# Patient Record
Sex: MALE | Race: WHITE | NOT HISPANIC OR LATINO | Employment: FULL TIME | URBAN - METROPOLITAN AREA
[De-identification: names, ages, dates, MRNs, and addresses within clinical notes are randomized per-mention and may not be internally consistent; named-entity substitution may affect disease eponyms.]

---

## 2019-09-30 ENCOUNTER — HOSPITAL ENCOUNTER (INPATIENT)
Facility: CLINIC | Age: 64
LOS: 2 days | Discharge: HOME | DRG: 378 | End: 2019-10-02
Attending: EMERGENCY MEDICINE | Admitting: INTERNAL MEDICINE
Payer: MEDICARE

## 2019-09-30 DIAGNOSIS — K92.2 GASTROINTESTINAL HEMORRHAGE, UNSPECIFIED GASTROINTESTINAL HEMORRHAGE TYPE: ICD-10-CM

## 2019-09-30 DIAGNOSIS — K92.1 MELENA: ICD-10-CM

## 2019-09-30 DIAGNOSIS — D64.9 ANEMIA, UNSPECIFIED TYPE: ICD-10-CM

## 2019-09-30 DIAGNOSIS — K26.4 GASTROINTESTINAL HEMORRHAGE ASSOCIATED WITH DUODENAL ULCER: Primary | ICD-10-CM

## 2019-09-30 LAB
ABO + RH BLD: NORMAL
ABO + RH BLD: NORMAL
ALBUMIN SERPL-MCNC: 3.4 G/DL (ref 3.4–5)
ALP SERPL-CCNC: 63 U/L (ref 40–150)
ALT SERPL W P-5'-P-CCNC: 28 U/L (ref 0–70)
ANION GAP SERPL CALCULATED.3IONS-SCNC: 4 MMOL/L (ref 3–14)
APTT PPP: 27 SEC (ref 22–37)
AST SERPL W P-5'-P-CCNC: 27 U/L (ref 0–45)
BASOPHILS # BLD AUTO: 0.1 10E9/L (ref 0–0.2)
BASOPHILS NFR BLD AUTO: 0.6 %
BILIRUB DIRECT SERPL-MCNC: 0.1 MG/DL (ref 0–0.2)
BILIRUB SERPL-MCNC: 0.4 MG/DL (ref 0.2–1.3)
BLD GP AB SCN SERPL QL: NORMAL
BLOOD BANK CMNT PATIENT-IMP: NORMAL
BUN SERPL-MCNC: 25 MG/DL (ref 7–30)
CALCIUM SERPL-MCNC: 8.8 MG/DL (ref 8.5–10.1)
CHLORIDE SERPL-SCNC: 105 MMOL/L (ref 94–109)
CO2 SERPL-SCNC: 29 MMOL/L (ref 20–32)
CREAT SERPL-MCNC: 1.3 MG/DL (ref 0.66–1.25)
DIFFERENTIAL METHOD BLD: ABNORMAL
EOSINOPHIL # BLD AUTO: 0.3 10E9/L (ref 0–0.7)
EOSINOPHIL NFR BLD AUTO: 2.1 %
ERYTHROCYTE [DISTWIDTH] IN BLOOD BY AUTOMATED COUNT: 13.2 % (ref 10–15)
GFR SERPL CREATININE-BSD FRML MDRD: 57 ML/MIN/{1.73_M2}
GLUCOSE SERPL-MCNC: 96 MG/DL (ref 70–99)
HCT VFR BLD AUTO: 26.6 % (ref 40–53)
HEMOCCULT STL QL: POSITIVE
HGB BLD-MCNC: 9 G/DL (ref 13.3–17.7)
IMM GRANULOCYTES # BLD: 0.3 10E9/L (ref 0–0.4)
IMM GRANULOCYTES NFR BLD: 2.1 %
INR PPP: 1.07 (ref 0.86–1.14)
LYMPHOCYTES # BLD AUTO: 3.4 10E9/L (ref 0.8–5.3)
LYMPHOCYTES NFR BLD AUTO: 26.5 %
MCH RBC QN AUTO: 32.3 PG (ref 26.5–33)
MCHC RBC AUTO-ENTMCNC: 33.8 G/DL (ref 31.5–36.5)
MCV RBC AUTO: 95 FL (ref 78–100)
MONOCYTES # BLD AUTO: 1 10E9/L (ref 0–1.3)
MONOCYTES NFR BLD AUTO: 8.1 %
NEUTROPHILS # BLD AUTO: 7.8 10E9/L (ref 1.6–8.3)
NEUTROPHILS NFR BLD AUTO: 60.6 %
NRBC # BLD AUTO: 0.1 10*3/UL
NRBC BLD AUTO-RTO: 1 /100
PLATELET # BLD AUTO: 200 10E9/L (ref 150–450)
POTASSIUM SERPL-SCNC: 3.8 MMOL/L (ref 3.4–5.3)
PROT SERPL-MCNC: 6.8 G/DL (ref 6.8–8.8)
RBC # BLD AUTO: 2.79 10E12/L (ref 4.4–5.9)
SODIUM SERPL-SCNC: 138 MMOL/L (ref 133–144)
SPECIMEN EXP DATE BLD: NORMAL
WBC # BLD AUTO: 12.9 10E9/L (ref 4–11)

## 2019-09-30 PROCEDURE — 12000000 ZZH R&B MED SURG/OB

## 2019-09-30 PROCEDURE — 96361 HYDRATE IV INFUSION ADD-ON: CPT

## 2019-09-30 PROCEDURE — 25000132 ZZH RX MED GY IP 250 OP 250 PS 637: Mod: GY | Performed by: INTERNAL MEDICINE

## 2019-09-30 PROCEDURE — 86900 BLOOD TYPING SEROLOGIC ABO: CPT | Performed by: EMERGENCY MEDICINE

## 2019-09-30 PROCEDURE — 25000128 H RX IP 250 OP 636: Performed by: EMERGENCY MEDICINE

## 2019-09-30 PROCEDURE — 99222 1ST HOSP IP/OBS MODERATE 55: CPT | Mod: AI | Performed by: INTERNAL MEDICINE

## 2019-09-30 PROCEDURE — 86850 RBC ANTIBODY SCREEN: CPT | Performed by: EMERGENCY MEDICINE

## 2019-09-30 PROCEDURE — 80048 BASIC METABOLIC PNL TOTAL CA: CPT | Performed by: EMERGENCY MEDICINE

## 2019-09-30 PROCEDURE — 85610 PROTHROMBIN TIME: CPT | Performed by: EMERGENCY MEDICINE

## 2019-09-30 PROCEDURE — 25800030 ZZH RX IP 258 OP 636: Performed by: INTERNAL MEDICINE

## 2019-09-30 PROCEDURE — 99285 EMERGENCY DEPT VISIT HI MDM: CPT | Mod: 25

## 2019-09-30 PROCEDURE — 85730 THROMBOPLASTIN TIME PARTIAL: CPT | Performed by: EMERGENCY MEDICINE

## 2019-09-30 PROCEDURE — 82272 OCCULT BLD FECES 1-3 TESTS: CPT | Performed by: EMERGENCY MEDICINE

## 2019-09-30 PROCEDURE — 80076 HEPATIC FUNCTION PANEL: CPT | Performed by: EMERGENCY MEDICINE

## 2019-09-30 PROCEDURE — 85025 COMPLETE CBC W/AUTO DIFF WBC: CPT | Performed by: EMERGENCY MEDICINE

## 2019-09-30 PROCEDURE — 86901 BLOOD TYPING SEROLOGIC RH(D): CPT | Performed by: EMERGENCY MEDICINE

## 2019-09-30 PROCEDURE — C9113 INJ PANTOPRAZOLE SODIUM, VIA: HCPCS | Performed by: EMERGENCY MEDICINE

## 2019-09-30 PROCEDURE — 96374 THER/PROPH/DIAG INJ IV PUSH: CPT

## 2019-09-30 RX ORDER — TRAZODONE HYDROCHLORIDE 50 MG/1
50 TABLET, FILM COATED ORAL AT BEDTIME
Status: DISCONTINUED | OUTPATIENT
Start: 2019-09-30 | End: 2019-10-02 | Stop reason: HOSPADM

## 2019-09-30 RX ORDER — MULTIPLE VITAMINS W/ MINERALS TAB 9MG-400MCG
1 TAB ORAL DAILY
Status: DISCONTINUED | OUTPATIENT
Start: 2019-09-30 | End: 2019-09-30

## 2019-09-30 RX ORDER — POTASSIUM CHLORIDE 1500 MG/1
20-40 TABLET, EXTENDED RELEASE ORAL
Status: DISCONTINUED | OUTPATIENT
Start: 2019-09-30 | End: 2019-10-02 | Stop reason: HOSPADM

## 2019-09-30 RX ORDER — LIDOCAINE 40 MG/G
CREAM TOPICAL
Status: DISCONTINUED | OUTPATIENT
Start: 2019-09-30 | End: 2019-10-02 | Stop reason: HOSPADM

## 2019-09-30 RX ORDER — ONDANSETRON 2 MG/ML
4 INJECTION INTRAMUSCULAR; INTRAVENOUS EVERY 6 HOURS PRN
Status: DISCONTINUED | OUTPATIENT
Start: 2019-09-30 | End: 2019-10-02 | Stop reason: HOSPADM

## 2019-09-30 RX ORDER — POTASSIUM CHLORIDE 29.8 MG/ML
20 INJECTION INTRAVENOUS
Status: DISCONTINUED | OUTPATIENT
Start: 2019-09-30 | End: 2019-10-02 | Stop reason: HOSPADM

## 2019-09-30 RX ORDER — ONDANSETRON 4 MG/1
4 TABLET, ORALLY DISINTEGRATING ORAL EVERY 6 HOURS PRN
Status: DISCONTINUED | OUTPATIENT
Start: 2019-09-30 | End: 2019-10-02 | Stop reason: HOSPADM

## 2019-09-30 RX ORDER — FOLIC ACID 1 MG/1
1 TABLET ORAL DAILY
Status: DISCONTINUED | OUTPATIENT
Start: 2019-09-30 | End: 2019-09-30

## 2019-09-30 RX ORDER — POTASSIUM CHLORIDE 1.5 G/1.58G
20-40 POWDER, FOR SOLUTION ORAL
Status: DISCONTINUED | OUTPATIENT
Start: 2019-09-30 | End: 2019-10-02 | Stop reason: HOSPADM

## 2019-09-30 RX ORDER — ROSUVASTATIN CALCIUM 5 MG/1
5 TABLET, COATED ORAL DAILY
COMMUNITY

## 2019-09-30 RX ORDER — ACETAMINOPHEN 325 MG/1
650 TABLET ORAL EVERY 4 HOURS PRN
Status: DISCONTINUED | OUTPATIENT
Start: 2019-09-30 | End: 2019-10-02 | Stop reason: HOSPADM

## 2019-09-30 RX ORDER — NALOXONE HYDROCHLORIDE 0.4 MG/ML
.1-.4 INJECTION, SOLUTION INTRAMUSCULAR; INTRAVENOUS; SUBCUTANEOUS
Status: DISCONTINUED | OUTPATIENT
Start: 2019-09-30 | End: 2019-10-02 | Stop reason: HOSPADM

## 2019-09-30 RX ORDER — POTASSIUM CHLORIDE 7.45 MG/ML
10 INJECTION INTRAVENOUS
Status: DISCONTINUED | OUTPATIENT
Start: 2019-09-30 | End: 2019-10-02 | Stop reason: HOSPADM

## 2019-09-30 RX ORDER — POTASSIUM CL/LIDO/0.9 % NACL 10MEQ/0.1L
10 INTRAVENOUS SOLUTION, PIGGYBACK (ML) INTRAVENOUS
Status: DISCONTINUED | OUTPATIENT
Start: 2019-09-30 | End: 2019-10-02 | Stop reason: HOSPADM

## 2019-09-30 RX ORDER — SODIUM CHLORIDE 9 MG/ML
INJECTION, SOLUTION INTRAVENOUS CONTINUOUS
Status: DISCONTINUED | OUTPATIENT
Start: 2019-09-30 | End: 2019-10-02 | Stop reason: HOSPADM

## 2019-09-30 RX ORDER — LANOLIN ALCOHOL/MO/W.PET/CERES
100 CREAM (GRAM) TOPICAL DAILY
Status: DISCONTINUED | OUTPATIENT
Start: 2019-09-30 | End: 2019-09-30

## 2019-09-30 RX ADMIN — SODIUM CHLORIDE 1000 ML: 9 INJECTION, SOLUTION INTRAVENOUS at 18:51

## 2019-09-30 RX ADMIN — Medication 1 MG: at 22:53

## 2019-09-30 RX ADMIN — PANTOPRAZOLE SODIUM 40 MG: 40 INJECTION, POWDER, FOR SOLUTION INTRAVENOUS at 18:51

## 2019-09-30 RX ADMIN — SODIUM CHLORIDE: 9 INJECTION, SOLUTION INTRAVENOUS at 20:35

## 2019-09-30 RX ADMIN — TRAZODONE HYDROCHLORIDE 25 MG: 50 TABLET ORAL at 23:40

## 2019-09-30 ASSESSMENT — MIFFLIN-ST. JEOR: SCORE: 1877.15

## 2019-09-30 NOTE — ED PROVIDER NOTES
History     Chief Complaint:  Blood in Stool     HPI   Augusto Henao is a 64 year old male with a history of hyperlipidemia, pancreatitis, CKD stage III, and gout who presents with blood in stool. The patient reports that 2 days ago he woke up and was feeling fine but noticed that his stool was black when he had a bowel movement. He states that he had felt constipated on Saturday therefore needed to strain. The patient throughout the day progressively became to feel short of breath, lightheaded as if he were going to have a syncopal episode upon standing, weakness, and nausea. However, he did not have any syncope episodes and since his shortness of breath has improved. The patient thas since had a bowel movement daily and they have also been black in color. He was evaluated in clinic today where he had a hemoglobin of 8.8. The patient denies loss of consciousness, vomiting, abdominal pain, chest pain, or history of similar symptoms. The patient does not use blood thinners and he does not have a history of ulcers. The patient occasionally will use aspirin but does not take ibuprofen. Lastly, he endorses 2-3 drinks a day consisting of rum and coke. He has not had any increase in this recently.     Allergies:  Ibuprofen; edema  Indomethacin  Morphine; edema       Medications:    Crestor     Past Medical History:    CKD stage III   Gout   Hyperlipidemia  Pancreatitis     Past Surgical History:    Total hip arthroplasty     Family History:    Family history reviewed. No pertinent family history.     Social History:  The patient was unaccompanied to the ED.  Smoking Status: Never Smoker  Smokeless Tobacco: Never Used  Alcohol Use: Positive  Drug Use: Negative  Marital Status:        Review of Systems   All other systems reviewed and are negative.    Physical Exam     Patient Vitals for the past 24 hrs:   BP Temp Temp src Pulse Resp SpO2 Height Weight   09/30/19 2012 133/75 98  F (36.7  C) Oral 74 16 100 % --  "--   09/30/19 1903 -- -- -- -- -- 97 % -- --   09/30/19 1900 134/67 -- -- 76 -- 91 % -- --   09/30/19 1835 -- -- -- -- -- 92 % -- --   09/30/19 1830 133/79 -- -- 74 -- 98 % -- --   09/30/19 1745 -- -- -- 74 -- 96 % -- --   09/30/19 1740 (!) 157/83 -- -- -- -- -- -- --   09/30/19 1615 (!) 167/82 98.8  F (37.1  C) Oral 74 16 100 % 1.956 m (6' 5\") 98 kg (216 lb)      Physical Exam  General: Resting on the bed.  Head: No obvious trauma to head.  Ears, Nose, Throat:  External ears normal.  Nose normal.  Pale conjunctiva.    Eyes:  Conjunctivae clear.  Pupils are equal, round, and reactive.   Neck: Normal range of motion.  Neck supple.   CV: Regular rate and rhythm.  No murmurs.      Respiratory: Effort normal and breath sounds normal.  No wheezing or crackles.   Gastrointestinal: Soft.  No distension. There is no tenderness.    Neuro: Alert. Moving all extremities appropriately.  Normal speech.    Skin: Skin is warm and dry.  No rash noted.   Rectal: melana noted on MICHA    Emergency Department Course     Laboratory:  Laboratory findings were communicated with the patient who voiced understanding of the findings.    CBC: WBC 12.9 (H), HGB 9.0 (L),   BMP: GFR 57 (L), o/w WNL (Creatinine 1.30 (H))    INR: 1.07  Partial Thromboplastin Time: 27  Hepatic Panel: WNL.    Occult Blood stool: Positive (A)    ABO/Rh type and screen: O positive, antibody negative     Interventions:  1851 NS 1000 mL IV  1851 Protonix 40 mg IV    Emergency Department Course:    1733 Nursing notes and vitals reviewed. I performed an exam of the patient as documented above.     1736 A stool occult blood sample was obtained for laboratory testing as documented above.    1736 IV was inserted and blood was drawn for laboratory testing, results above.     1841 I spoke with Dr. Jarrett of the GI service regarding patient's presentation, findings, and plan of care.     1850 I spoke with Dr. Marie of the hospitalist service from [facility] " regarding patient's presentation, findings, and plan of care.     1855 Patient rechecked and updated.        Prior to admission, I personally reviewed the results with the patient and all related questions were answered. The patient verbalized understanding and is amenable to plan.     Impression & Plan      Medical Decision Making:  Augusto Henao is a 64 year old male who presents to the emergency department today for evaluation of weakness.  Vital signs are reassuring.  Broad differential was pursued including but not limited to upper versus lower GI bleed, peptic ulcer disease, gastritis, esophagitis, variceal bleed, lower GI bleed such as diverticular bleed, fissure, hemorrhoid, etc.  Overall this is most consistent with a upper GI bleed secondary to the dark melena noted on digital rectal exam which is occult positive.  CBC shows mild leukocytosis suspect stress demargination in the setting of bleed.  Hemoglobin is 9 which is a significant drop from August when his hemoglobin was 15.  Platelets were normal.  BMP shows chronic kidney dysfunction but otherwise no acute electrolyte or metabolic abnl.  Coags are within normal limits.  LFTs are within normal limits not suggestive of liver failure.  Patient does drink alcohol regularly but low suspicion for alcohol-related variceal bleed with normal platelets and normal LFTs.  Discussed with gastroenterology who recommended Protonix and likely endoscopy tomorrow.  Patient is otherwise hemodynamically stable in the ER.  No indication for emergent endoscopy in the ER.  GI did not feel that octreotide was indicated.  Patient was admitted to the hospitalist who graciously accepted.    Diagnosis:    ICD-10-CM    1. Melena K92.1 Hepatic panel   2. Gastrointestinal hemorrhage, unspecified gastrointestinal hemorrhage type K92.2    3. Anemia, unspecified type D64.9      Disposition:   The patient is admitted into the care of Dr. Marie.     Al Disclosure:  Andie RODRIGUEZ  [place], am serving as a scribe at 5:37 PM on 9/30/2019 to document services personally performed by Kaylee Whittaker MD based on my observations and the provider's statements to me.    EMERGENCY DEPARTMENT       Kaylee Whittaker MD  10/01/19 0138

## 2019-09-30 NOTE — ED NOTES
"[facility]  ED Nurse Handoff Report    ED Chief complaint: Rectal Bleeding      ED Diagnosis:   Final diagnoses:   None       Code Status: Full Code    Allergies:   Allergies   Allergen Reactions     Ibuprofen      Other reaction(s): Edema     Indomethacin Other (See Comments)     Put him into stage 3 renal failure     Morphine      Other reaction(s): Edema       Activity level - Baseline/Home:  Independent  Activity Level - Current:   Independent    Patient's Preferred language: English   Needed?: No    Isolation: No  Infection: Not Applicable  Bariatric?: No    Vital Signs:   Vitals:    09/30/19 1740 09/30/19 1745 09/30/19 1830 09/30/19 1835   BP: (!) 157/83  133/79    Pulse:  74 74    Resp:       Temp:       TempSrc:       SpO2:  96% 98% 92%   Weight:       Height:           Cardiac Rhythm: ,        Pain level:      Is this patient confused?: No   Does this patient have a guardian?  No         If yes, is there guardianship documents in the Epic \"Code/ACP\" activity?  N/A         Guardian Notified?  N/A  [place] - Suicide Severity Rating Scale Completed?  Yes  If yes, what color did the patient score?  White    Patient Report: Initial Complaint: rectal bleeding  Focused Assessment: Pt presents from home with black stools since Saturday. Pt has no known hx of ulcers but does drink daily (2-3 mix drinks) and reports on Saturday developed dark black stools. Pt states he was dizzy, sob and weak on Saturday but that has been improving. Pt having less frequent stools since Saturday as well. Pt not on blood thinners. hgb 9, type and screen performed. Positive stool occult. VSS. Pt a/ox4, independent   Tests Performed: Blood work, stool occult  Abnormal Results:   Labs Ordered and Resulted from Time of ED Arrival Up to the Time of Departure from the ED   CBC WITH PLATELETS DIFFERENTIAL - Abnormal; Notable for the following components:       Result Value    WBC 12.9 (*)     RBC Count 2.79 (*)  "    Hemoglobin 9.0 (*)     Hematocrit 26.6 (*)     Nucleated RBCs 1 (*)     All other components within normal limits   BASIC METABOLIC PANEL - Abnormal; Notable for the following components:    Creatinine 1.30 (*)     GFR Estimate 57 (*)     All other components within normal limits   OCCULT BLOOD STOOL - Abnormal; Notable for the following components:    Occult Blood Positive (*)     All other components within normal limits   INR   PARTIAL THROMBOPLASTIN TIME   HEPATIC PANEL   ABO/RH TYPE AND SCREEN       Treatments provided: 1 liter NS, protonix    Family Comments: None present    OBS brochure/video discussed/provided to patient/family: N/A              Name of person given brochure if not patient: n/a              Relationship to patient: n/a    ED Medications: Medications - No data to display    Drips infusing?:  No    For the majority of the shift this patient was Green.   Interventions performed were meds, repo, updates.    Severe Sepsis OR Septic Shock Diagnosis Present: No    To be done/followed up on inpatient unit:  inpt orders    ED NURSE PHONE NUMBER: *[phone]

## 2019-10-01 LAB
ANION GAP SERPL CALCULATED.3IONS-SCNC: 3 MMOL/L (ref 3–14)
BUN SERPL-MCNC: 17 MG/DL (ref 7–30)
CALCIUM SERPL-MCNC: 7.9 MG/DL (ref 8.5–10.1)
CHLORIDE SERPL-SCNC: 111 MMOL/L (ref 94–109)
CO2 SERPL-SCNC: 26 MMOL/L (ref 20–32)
CREAT SERPL-MCNC: 1.29 MG/DL (ref 0.66–1.25)
ERYTHROCYTE [DISTWIDTH] IN BLOOD BY AUTOMATED COUNT: 13.7 % (ref 10–15)
GFR SERPL CREATININE-BSD FRML MDRD: 57 ML/MIN/{1.73_M2}
GLUCOSE SERPL-MCNC: 94 MG/DL (ref 70–99)
HCT VFR BLD AUTO: 22.5 % (ref 40–53)
HGB BLD-MCNC: 7.5 G/DL (ref 13.3–17.7)
HGB BLD-MCNC: 7.5 G/DL (ref 13.3–17.7)
HGB BLD-MCNC: 8.1 G/DL (ref 13.3–17.7)
HGB BLD-MCNC: 8.3 G/DL (ref 13.3–17.7)
MCH RBC QN AUTO: 32.3 PG (ref 26.5–33)
MCHC RBC AUTO-ENTMCNC: 33.3 G/DL (ref 31.5–36.5)
MCV RBC AUTO: 97 FL (ref 78–100)
PLATELET # BLD AUTO: 146 10E9/L (ref 150–450)
POTASSIUM SERPL-SCNC: 3.8 MMOL/L (ref 3.4–5.3)
RBC # BLD AUTO: 2.32 10E12/L (ref 4.4–5.9)
SODIUM SERPL-SCNC: 140 MMOL/L (ref 133–144)
UPPER GI ENDOSCOPY: NORMAL
WBC # BLD AUTO: 8.3 10E9/L (ref 4–11)

## 2019-10-01 PROCEDURE — 25000128 H RX IP 250 OP 636: Performed by: INTERNAL MEDICINE

## 2019-10-01 PROCEDURE — C9113 INJ PANTOPRAZOLE SODIUM, VIA: HCPCS | Performed by: INTERNAL MEDICINE

## 2019-10-01 PROCEDURE — 25000132 ZZH RX MED GY IP 250 OP 250 PS 637: Mod: GY | Performed by: INTERNAL MEDICINE

## 2019-10-01 PROCEDURE — 85018 HEMOGLOBIN: CPT | Performed by: INTERNAL MEDICINE

## 2019-10-01 PROCEDURE — 12000000 ZZH R&B MED SURG/OB

## 2019-10-01 PROCEDURE — 80048 BASIC METABOLIC PNL TOTAL CA: CPT | Performed by: INTERNAL MEDICINE

## 2019-10-01 PROCEDURE — 85027 COMPLETE CBC AUTOMATED: CPT | Performed by: INTERNAL MEDICINE

## 2019-10-01 PROCEDURE — 40000104 ZZH STATISTIC MODERATE SEDATION < 10 MIN: Performed by: INTERNAL MEDICINE

## 2019-10-01 PROCEDURE — 99232 SBSQ HOSP IP/OBS MODERATE 35: CPT | Performed by: INTERNAL MEDICINE

## 2019-10-01 PROCEDURE — 36415 COLL VENOUS BLD VENIPUNCTURE: CPT | Performed by: INTERNAL MEDICINE

## 2019-10-01 PROCEDURE — 0DJ08ZZ INSPECTION OF UPPER INTESTINAL TRACT, VIA NATURAL OR ARTIFICIAL OPENING ENDOSCOPIC: ICD-10-PCS | Performed by: INTERNAL MEDICINE

## 2019-10-01 PROCEDURE — 43235 EGD DIAGNOSTIC BRUSH WASH: CPT | Performed by: INTERNAL MEDICINE

## 2019-10-01 PROCEDURE — 25800030 ZZH RX IP 258 OP 636: Performed by: INTERNAL MEDICINE

## 2019-10-01 RX ORDER — LIDOCAINE 40 MG/G
CREAM TOPICAL
Status: DISCONTINUED | OUTPATIENT
Start: 2019-10-01 | End: 2019-10-01 | Stop reason: HOSPADM

## 2019-10-01 RX ORDER — FENTANYL CITRATE 50 UG/ML
INJECTION, SOLUTION INTRAMUSCULAR; INTRAVENOUS PRN
Status: DISCONTINUED | OUTPATIENT
Start: 2019-10-01 | End: 2019-10-01 | Stop reason: HOSPADM

## 2019-10-01 RX ADMIN — PANTOPRAZOLE SODIUM 40 MG: 40 INJECTION, POWDER, FOR SOLUTION INTRAVENOUS at 08:03

## 2019-10-01 RX ADMIN — SODIUM CHLORIDE: 9 INJECTION, SOLUTION INTRAVENOUS at 16:33

## 2019-10-01 RX ADMIN — PANTOPRAZOLE SODIUM 40 MG: 40 INJECTION, POWDER, FOR SOLUTION INTRAVENOUS at 21:18

## 2019-10-01 RX ADMIN — SODIUM CHLORIDE: 9 INJECTION, SOLUTION INTRAVENOUS at 06:22

## 2019-10-01 RX ADMIN — POLYETHYLENE GLYCOL 3350, SODIUM SULFATE ANHYDROUS, SODIUM BICARBONATE, SODIUM CHLORIDE, POTASSIUM CHLORIDE 4000 ML: 236; 22.74; 6.74; 5.86; 2.97 POWDER, FOR SOLUTION ORAL at 17:45

## 2019-10-01 ASSESSMENT — ACTIVITIES OF DAILY LIVING (ADL)
ADLS_ACUITY_SCORE: 10
ADLS_ACUITY_SCORE: 10
ADLS_ACUITY_SCORE: 13
ADLS_ACUITY_SCORE: 13
ADLS_ACUITY_SCORE: 10

## 2019-10-01 ASSESSMENT — MIFFLIN-ST. JEOR: SCORE: 1858.1

## 2019-10-01 NOTE — PROVIDER NOTIFICATION
MD Notification    Notified Person: MD    Notified Person Name: Dr. Peralta    Notification Date/Time: 9/30/19     Notification Interaction: Telephone    Purpose of Notification: Pt requesting medication for sleep other than melatonin.    Orders Received: Scheduled Trazodone added    Comments:

## 2019-10-01 NOTE — PLAN OF CARE
DATE & TIME: 9/30/19 3199-1563  Cognitive Concerns/ Orientation : Pt A/Ox4   BEHAVIOR & AGGRESSION TOOL COLOR: Green  CIWA SCORE: 0/0/0   ABNL VS/O2: VSS on RA  MOBILITY: SBA, fall risk  PAIN MANAGMENT: Denies  DIET: NPO  BOWEL/BLADDER: Continent  ABNL LAB/BG: Hgb 9.0/7.5  DRAIN/DEVICES: IVF infusing  TELEMETRY RHYTHM: NSR  SKIN: Intact  TESTS/PROCEDURES: EGD this morning  D/C DAY/GOALS/PLACE: Discharge pending progress  OTHER IMPORTANT INFO: Bowel sounds active. Last BM this morning. No complaints of dizziness. No active signs of bleeding. GI following.

## 2019-10-01 NOTE — PROGRESS NOTES
[facility]    Medicine Progress Note - Hospitalist Service       Date of Admission:  9/30/2019  Assessment & Plan   Augusto Henao is a 64 year-old M with past medical history of dyslipidemia, gout, chronic kidney disease stage III, and pancreatitis, who came in for evaluation of black stools and lightheadedness.      Melena   Suspected upper GI bleeding  Acute blood loss anemia  Presents with black stools associated with weakness and lightheadedness.  Reports occasional Excedrin use as well as daily alcohol use. Hemoglobin 9.0 g/dl on admission from 15.1 g/dl 8/2/19. Differential includes erosive gastritis versus peptic ulcer disease versus duodenitis.  Recent Labs   Lab 10/01/19  0625 10/01/19  0001 09/30/19  1736   HGB 7.5* 7.5* 9.0*      - GI ([place]) consulted, plan for EGD  - Continue IV PPI  - NPO until EGD  - Serial hemoglobins  - Conditional transfusion orders in place for hemoglobin < 7 g/dl      Dyslipidemia  - Holding his prior to admission statin for now.     Chronic kidney disease, stage III  Baseline creatinine 1.3-1.7 with GFR in 40s-50s. Creatinine 1.30 on admission.   - Creatinine stable and at baseline  - Avoid nephrotoxins  - Monitor BMP     Leukocytosis, resolved  WBC elevated at 12.9, likely reactive.  He denies any fevers, no cold symptoms, no urinary symptoms.  We will monitor him off antibiotics.  Repeat CBC in the morning.   - WBC normalized     History of gout  No acute issues    Alcohol overuse  He reports drinking 2 to 3 drinks of rum daily.  He denies history of alcohol withdrawals.  - Denies any history of work, family or legal troubles related to alcohol  - Discussed recommended alcohol limits, risks of overuse.   - Continue CIWA monitoring    Diet: NPO per Anesthesia Guidelines for Procedure/Surgery Except for: Meds    DVT Prophylaxis: Pneumatic Compression Devices  Rahman Catheter: not present  Code Status: Full Code      Disposition Plan   Expected discharge: In  1-2 days pending EGD, stable hemoglobin   Entered: Jeremy Smith MD 10/01/2019, 10:41 AM       The patient's care was discussed with the Patient.    Jeremy Smith MD  Hospitalist Service  [facility]    ______________________________________________________________________    Interval History   No acute events overnight. Denies any bowel movements since admission. Denies any abdominal pain, n/v. Reviewed alcohol use further as above.     Data reviewed today: I reviewed all medications, new labs and imaging results over the last 24 hours. I personally reviewed no images or EKG's today.    Physical Exam   Vital Signs: Temp: 97.8  F (36.6  C) Temp src: Oral BP: 110/66 Pulse: 67   Resp: 16 SpO2: 99 % O2 Device: None (Room air)    Weight: 211 lbs 12.8 oz    Constitutional: Well-appearing, NAD  Respiratory: Clear to auscultation bilaterally, good air movement bilaterally  Cardiovascular: RRR, no m/r/g. No peripheral edema.  GI: Soft, non-tender, non-distended.   Skin/Integumen: Warm, dry  Other:      Data   Recent Labs   Lab 10/01/19  0625 10/01/19  0001 09/30/19  1736   WBC 8.3  --  12.9*   HGB 7.5* 7.5* 9.0*   MCV 97  --  95   *  --  200   INR  --   --  1.07     --  138   POTASSIUM 3.8  --  3.8   CHLORIDE 111*  --  105   CO2 26  --  29   BUN 17  --  25   CR 1.29*  --  1.30*   ANIONGAP 3  --  4   SWAPNIL 7.9*  --  8.8   GLC 94  --  96   ALBUMIN  --   --  3.4   PROTTOTAL  --   --  6.8   BILITOTAL  --   --  0.4   ALKPHOS  --   --  63   ALT  --   --  28   AST  --   --  27       No results found for this or any previous visit (from the past 24 hour(s)).  Medications     sodium chloride 100 mL/hr at 10/01/19 0622       pantoprazole (PROTONIX) IV  40 mg Intravenous BID     sodium chloride (PF)  3 mL Intracatheter Q8H     traZODone  25 mg Oral At Bedtime    Or     traZODone  50 mg Oral At Bedtime

## 2019-10-01 NOTE — PROGRESS NOTES
RECEIVING UNIT ED HANDOFF REVIEW    ED Nurse Handoff Report was reviewed by: Cat Knox RN on September 30, 2019 at 7:48 PM

## 2019-10-01 NOTE — H&P
Admitted:     09/30/2019      PRIMARY CARE PHYSICIAN:  Calderon Bertrand MD.      DATE OF ADMISSION/SERVICE:  09/30/2019.      CHIEF COMPLAINT:  Black stools and weakness.      HISTORY OF PRESENT ILLNESS:  Had been obtained from the patient, who is a good historian.  I discussed with ER attending, Dr. Whittaker.      Mr. Augusto Henao is a very pleasant 64-year-old gentleman with past medical history of dyslipidemia, gout, chronic kidney disease stage III, and pancreatitis who came in for evaluation of black stools and weakness.      The patient states that, on Saturday morning, he started having black stools.  He had 3 black stools on Saturday, which was unusual for him.  He never had black stools before.  He states that he started feeling bloated, but no real abdominal pain.  He also reported feeling mildly nauseated, but no vomiting.  He reports that he started feeling weak and dizzy, described it as lightheadedness, especially when he was changing his position from sitting to standing up, and he felt like he was going to pass out.  He also reports feeling slightly short of breath, but no chest pain.  On Sunday, he had another black stool and another black stool today, on Monday, 09/30/2019.  Earlier today, he went to his primary care physician's office where he had his CBC checked and it showed the hemoglobin down to 8.8, from 15.1, in 08/2019.  His hematocrit 26.5, white blood cells elevated at 13.4.  He was advised to come to ER for further evaluation.      Upon further questioning, again, patient denies any abdominal pain.  He denies chest pain, no fevers, no cold symptoms, no headaches, no dysuria, no leg swelling.      In ER, he was seen by Dr. Whittaker.  His initial vitals showed a blood pressure of 167/82.  His heart rate was 74, respiratory rate 16, oxygen saturation 96% to 100% on room air, temperature 98.8.      In ER, he had another set of blood work checked, his CBC confirmed a low hemoglobin of 9, white  blood cells 12.9, hematocrit 26.6, platelet count 200.  His occult blood was positive.  His BMP showed a sodium 138, potassium 3.8, chloride 105, bicarb 29, BUN 25, creatinine 1.3.  His anion gap of 4.  LFTs with albumin 3.4, total protein 6.8, total bilirubin 0.4, alkaline phosphatase 63, ALT 28, AST 27.  INR 1.07.  She was typed and screened in ER.  ER attending discussed with Dr. Jarrett, on-call, regarding this patient, and Hospitalist Service was called regarding the admission.      The patient denies taking NSAIDs.  He reported, in the last few weeks, he did take some Excedrin a few times.  He is not on warfarin or other anticoagulation.  He does drink 2 to 3 drinks of rum daily.      PAST MEDICAL HISTORY:   1.  Dyslipidemia.   2.  Gout.   3.  Chronic kidney disease, stage 3.  His baseline creatinine seems to be between 1.3 to 1.7.   4.  History of pancreatitis.      PAST SURGICAL HISTORY:  Left total hip arthroplasty.      SOCIAL HISTORY:  The patient used to smoke.  He quit smoking in 2003.  He reports 2 to 3 drinks of rum with Coke daily.  He denies illicit drug abuse.      FAMILY HISTORY:  Reviewed with the patient.  It seems that his mother did not have any major medical problems.  His father had diabetes mellitus and hypertension.  He has a sister with no major medical problems.      PRIOR TO ADMISSION MEDICATIONS:    No current facility-administered medications on file prior to encounter.   rosuvastatin (CRESTOR) 5 MG tablet, Take 5 mg by mouth daily      ALLERGIES:    Allergies   Allergen Reactions     Ibuprofen      Other reaction(s): Edema     Indomethacin Other (See Comments)     Put him into stage 3 renal failure     Morphine      Other reaction(s): Edema        REVIEW OF SYSTEMS:  The 10-point review of systems was conducted and it was negative, except for pertinent positives mentioned in the history of present illness.      PHYSICAL EXAMINATION:   VITAL SIGNS:  Blood pressure is 133/79, heart rate  70, respiratory rate 16, oxygen saturation 96% on room air, and temperature 98.8.   GENERAL:  The patient is awake, alert, no acute distress at the time of my examination.   HEENT:  Normocephalic, atraumatic.  Pupils are equally round and reactive to light.  Oral mucosa is moist.   NECK:  Supple.  No cervical lymphadenopathy, no thyromegaly.   CHEST:  There is bilateral air entry, no wheezing, no rales, no crackles.   CARDIOVASCULAR:  There is normal S1, S2, regular rate and rhythm, no murmurs, no rubs.   ABDOMEN:  Soft, nontender, nondistended.  Bowel sounds are present.   EXTREMITIES:  There is no leg swellings, no calf tenderness, 2+ peripheral pulses are palpable.   SKIN:  Intact, no rash, no cyanosis.  Mild pallor noted.   NEUROLOGIC:  The patient is awake, alert, oriented to self, place and time.  There are no focal neurological deficits.   PSYCHIATRIC:  Normal mood, normal affect.   MUSCULOSKELETAL:  He moves all extremities freely.  There are no obvious joint deformities.      LABORATORY DATA:  Reviewed and dictated above.      ASSESSMENT AND PLAN:  Mr. Augusto Henao is a very pleasant 64-year-old gentleman with past medical history of dyslipidemia, gout, chronic kidney disease stage III, and pancreatitis, who came in for evaluation of black stools and lightheadedness.      PLAN:   1.  Suspected upper GI bleeding.      Acute blood loss anemia   He started having black stools on Saturday, associated with weakness and lightheadedness.  He never had similar problems before.  He denies history of gastroesophageal reflux disease or peptic ulcer disease.  He is not using NSAIDs or other anticoagulation.  He did take a few tablets of Excedrin in the last few weeks.  He also reports drinking 2 to 3 drinks of rum daily. Hb dropped from 15.1 in 08/2019 to 9 on admission. He is hemodynamically stable. Differential diagnosis at this time might be erosive gastritis versus peptic ulcer disease versus duodenitis.  He  received 1 dose of Protonix 40 mg IV in the ER.  Plan for now is to admit him to [facility] bed.  He will be kept n.p.o. for now.  We will continue with IV Protonix 40 mg twice daily.  We will order IV fluids, pain medications, antiemetics p.r.n.  We will check his hemoglobin every 6 hours with plan to transfuse if hemoglobin drops below 7, or if he becomes hemodynamically unstable.  The blood transfusion consent had been signed.  A formal GI consulted requested by Dr. Jarrett.   2.  Dyslipidemia.  I am holding his prior to admission statin for now.   3.  Chronic kidney disease, stage III.  His baseline creatinine seems to be between 1.3 to 1.7.  Currently, his creatinine is 1.3 at baseline.   4.  Leukocytosis.  His white blood cells are elevated at 12.9, likely reactive.  He denies any fevers, no cold symptoms, no urinary symptoms.  We will monitor him off antibiotics.  Repeat CBC in the morning.   5.  History of gout.  This does not seem to be an acute issue at this time.   6.  Alcohol abuse.  He reports drinking 2 to 3 drinks of rum with Coke daily.  He denies history of alcohol withdrawals.  We will order a CIWA for monitoring.  No Ativan ordered at this time.   7.  DVT prophylaxis:  Pneumatic compression device.      CODE STATUS:  Discussed with the patient and patient is FULL CODE.      DISPOSITION:  Admit to inpatient.  I anticipate at least a couple of days of hospitalization.         CHARU ROBERTO MD             D: 2019   T: 2019   MT: COMFORT      Name:     MARK WELCH   MRN:      [id number]        Account:      [id number]   :      1955        Admitted:     2019                   Document: [id number]

## 2019-10-01 NOTE — PLAN OF CARE
DATE & TIME: 10/1/2019 9989-9825  Cognitive Concerns/ Orientation : A&O x4, calm and cooperative   BEHAVIOR & AGGRESSION TOOL COLOR: Green  CIWA SCORE: 0, 0, 0  ABNL VS/O2: VSS on RA  MOBILITY: SBA  PAIN MANAGMENT: Denies pain  DIET: Clear liquids  BOWEL/BLADDER: Continent   ABNL LAB/BG: Hgb 8.3 (Q6H), Creat 1.29, Plts 146  DRAIN/DEVICES: IVF running at 100mL/hr  TELEMETRY RHYTHM: NSR  SKIN: WDL  TESTS/PROCEDURES: EGD completed, no active signs of bleeding found. Plan for colonoscopy tomorrow  D/C DAY/GOALS/PLACE: Discharge pending  OTHER IMPORTANT INFO: LS clear, BS hypoactive x4. Denies lightheadedness, SOB. Refusing PCDs. GI following. Go Lytely prep started. Patient refusing to have the bed alarm off while going through bowel prep. Educated pt.

## 2019-10-01 NOTE — CONSULTS
Consult received.  Likely upper GI bleed with melena.Likely PUD  Admit to IMC. Hemodynamically stable  Serial hemoglobins. Type and cross.  I/V Protonix bid  EGD in Am or tonight if change in status.    Celestino Jarrett MD FACP  [facility].

## 2019-10-01 NOTE — ED NOTES
Pt was already on ED cart and was brought to floor via ERT per ED RN request. Pt's belongings were also audited and transported to the floor.

## 2019-10-01 NOTE — PHARMACY-ADMISSION MEDICATION HISTORY
Admission medication history interview status for the 9/30/2019  admission is complete. See EPIC admission navigator for prior to admission medications     Medication history source reliability:Good    Actions taken by pharmacist (provider contacted, etc): interview with patient.     Additional medication history information not noted on PTA med list :None    Medication reconciliation/reorder completed by provider prior to medication history? No    Time spent in this activity: 5 min    Prior to Admission medications    Medication Sig Last Dose Taking? Auth Provider   rosuvastatin (CRESTOR) 5 MG tablet Take 5 mg by mouth daily 9/30/2019 at am Yes Unknown, Entered By History

## 2019-10-01 NOTE — CONSULTS
[facility]  Gastroenterology Consultation         Augutso Henao  [address] [facility] [address]  64 year old male    Admission Date/Time: 9/30/2019  Primary Care Provider: Calderon Bertrand  Referring / Attending Physician:  Dr. Francheska Marie    We were asked to see the patient in consultation by Dr. Francheska Marie for evaluation of melena and anemia.      CC: melena and lightheadedness    HPI:  Augusto Henao is a 64 year old male who has a past medical history of dyslipidemia, gout, chronic kidney disease stage III, and pancreatitis who came in for evaluation of black stools, weakness and dizziness. He states noted to be lightheaded on 09/28/2019 and this slowly increased over last 2 days. His stools were dark black starting on 9/29/2019 and has had additional dark black stools since. Denies abdominal pain, heartburn, dysphagia, hematochezia, epitaxies, chest pain, nausea, vomiting, diarrhea, fever or chills. Has no history of gastric ulcer in past. Denies use of NSAIDs or anticoagulation. He does not smoke. He drinks 2-3 alcoholic beverages daily. His last colonoscopy was one year ago at an outside facility.  He saw his primary care physician's office where he had his CBC checked and it showed the hemoglobin down to 8.8, from 15.1, in 08/2019.    He has been afebrile, -160 systolic. Labs in ED revealed hemoglobin of 9.0 and has decreased to 7.5 and remained stable. Platelets 200--> 146, WBC 12.9 --> 8.3. Creatinine 1.4. Electrolytes normal. LFTs normal. Fecal occult was positive for blood. INR 1.07    ROS: A comprehensive ten point review of systems was negative aside from those in mentioned in the HPI.      PAST MED HX:  I have reviewed this patient's medical history and updated it with pertinent information if needed.   No past medical history on file.    MEDICATIONS:   Prior to Admission Medications   Prescriptions Last Dose Informant Patient Reported? Taking?   rosuvastatin  (CRESTOR) 5 MG tablet 9/30/2019 at am  Yes Yes   Sig: Take 5 mg by mouth daily      Facility-Administered Medications: None       ALLERGIES:   Allergies   Allergen Reactions     Ibuprofen      Other reaction(s): Edema     Indomethacin Other (See Comments)     Put him into stage 3 renal failure     Morphine      Other reaction(s): Edema       SOCIAL HISTORY:  Social History     Tobacco Use     Smoking status: Not on file   Substance Use Topics     Alcohol use: Not on file     Drug use: Not on file       FAMILY HISTORY:  No family history on file.    PHYSICAL EXAM:   General  Alert, oriented and comfortable  Vital Signs with Ranges  Temp: 97.8  F (36.6  C) Temp src: Oral BP: 110/66 Pulse: 67   Resp: 16 SpO2: 99 % O2 Device: None (Room air)    I/O last 3 completed shifts:  In: 980 [I.V.:980]  Out: 275 [Urine:275]    Constitutional: healthy, alert and no distress   Cardiovascular: negative, PMI normal. No lifts, heaves, or thrills. RRR. No murmurs, clicks gallops or rub  Respiratory: negative, Percussion normal. Good diaphragmatic excursion. Lungs clear  Head: Normocephalic. No masses, lesions, tenderness or abnormalities  Neck: Neck supple. No adenopathy. Thyroid symmetric, normal size,, Carotids without bruits.  Abdomen: Abdomen soft, non-tender. BS normal. No masses, organomegaly          ADDITIONAL COMMENTS:   I reviewed the patient's new clinical lab test results.   Recent Labs   Lab Test 10/01/19  0625 10/01/19  0001 09/30/19  1736   WBC 8.3  --  12.9*   HGB 7.5* 7.5* 9.0*   MCV 97  --  95   *  --  200   INR  --   --  1.07     Recent Labs   Lab Test 10/01/19  0625 09/30/19  1736   POTASSIUM 3.8 3.8   CHLORIDE 111* 105   CO2 26 29   BUN 17 25   ANIONGAP 3 4     Recent Labs   Lab Test 09/30/19  1736   ALBUMIN 3.4   BILITOTAL 0.4   ALT 28   AST 27       I reviewed the patient's new imaging results.        CONSULTATION ASSESSMENT AND PLAN:    Active Problems:    GI bleeding    Melena    Anemia    Augusto Henao  is a pleasant 64 year old male with past medical history as listed above. Has 2 day history of melena, lightheaded and dizziness. Drinks to alcoholic beverages daily and has no other risk factors for GI bleed. Last colonoscopy over a year ago in outside facility and has no history of previous gastric ulcer. Patient likely has developed PUD vs angiodysplastic lesion. Other causes would include lower GI bleed such as colon ulcer, diverticulitis, vs polyp or less likely neoplastic cause.   1. Will plan EGD today  2. Check hemoglobin every 6 hours  3. Transfused PRBC with hemoglobin at 7.0 or less  4. Continue with IV pantoprazole  5. NPO until post EGD.   6. IF EGD negative will plan colonoscopy tomorrow.          [facility].  Office: [phone]  Cell : [phone] (Dr. Jarrett)  Cell: [phone] (Christina Higgins PA-C)

## 2019-10-02 VITALS
WEIGHT: 211.8 LBS | TEMPERATURE: 97.5 F | SYSTOLIC BLOOD PRESSURE: 125 MMHG | BODY MASS INDEX: 25.01 KG/M2 | RESPIRATION RATE: 7 BRPM | HEART RATE: 74 BPM | HEIGHT: 77 IN | DIASTOLIC BLOOD PRESSURE: 70 MMHG | OXYGEN SATURATION: 100 %

## 2019-10-02 LAB
ANION GAP SERPL CALCULATED.3IONS-SCNC: 3 MMOL/L (ref 3–14)
BUN SERPL-MCNC: 11 MG/DL (ref 7–30)
CALCIUM SERPL-MCNC: 8.1 MG/DL (ref 8.5–10.1)
CHLORIDE SERPL-SCNC: 111 MMOL/L (ref 94–109)
CO2 SERPL-SCNC: 27 MMOL/L (ref 20–32)
COLONOSCOPY: NORMAL
CREAT SERPL-MCNC: 1.21 MG/DL (ref 0.66–1.25)
GFR SERPL CREATININE-BSD FRML MDRD: 62 ML/MIN/{1.73_M2}
GLUCOSE SERPL-MCNC: 84 MG/DL (ref 70–99)
HGB BLD-MCNC: 7.8 G/DL (ref 13.3–17.7)
HGB BLD-MCNC: 8.7 G/DL (ref 13.3–17.7)
POTASSIUM SERPL-SCNC: 3.8 MMOL/L (ref 3.4–5.3)
SODIUM SERPL-SCNC: 141 MMOL/L (ref 133–144)

## 2019-10-02 PROCEDURE — 80048 BASIC METABOLIC PNL TOTAL CA: CPT | Performed by: INTERNAL MEDICINE

## 2019-10-02 PROCEDURE — 25800030 ZZH RX IP 258 OP 636: Performed by: INTERNAL MEDICINE

## 2019-10-02 PROCEDURE — 25000128 H RX IP 250 OP 636: Performed by: INTERNAL MEDICINE

## 2019-10-02 PROCEDURE — 45378 DIAGNOSTIC COLONOSCOPY: CPT | Performed by: INTERNAL MEDICINE

## 2019-10-02 PROCEDURE — 85018 HEMOGLOBIN: CPT | Performed by: INTERNAL MEDICINE

## 2019-10-02 PROCEDURE — 36415 COLL VENOUS BLD VENIPUNCTURE: CPT | Performed by: INTERNAL MEDICINE

## 2019-10-02 PROCEDURE — 0DJD8ZZ INSPECTION OF LOWER INTESTINAL TRACT, VIA NATURAL OR ARTIFICIAL OPENING ENDOSCOPIC: ICD-10-PCS | Performed by: INTERNAL MEDICINE

## 2019-10-02 PROCEDURE — 99238 HOSP IP/OBS DSCHRG MGMT 30/<: CPT | Performed by: INTERNAL MEDICINE

## 2019-10-02 PROCEDURE — G0500 MOD SEDAT ENDO SERVICE >5YRS: HCPCS | Performed by: INTERNAL MEDICINE

## 2019-10-02 RX ORDER — NALOXONE HYDROCHLORIDE 0.4 MG/ML
.1-.4 INJECTION, SOLUTION INTRAMUSCULAR; INTRAVENOUS; SUBCUTANEOUS
Status: DISCONTINUED | OUTPATIENT
Start: 2019-10-02 | End: 2019-10-02 | Stop reason: HOSPADM

## 2019-10-02 RX ORDER — LIDOCAINE 40 MG/G
CREAM TOPICAL
Status: DISCONTINUED | OUTPATIENT
Start: 2019-10-02 | End: 2019-10-02 | Stop reason: HOSPADM

## 2019-10-02 RX ORDER — FENTANYL CITRATE 50 UG/ML
INJECTION, SOLUTION INTRAMUSCULAR; INTRAVENOUS PRN
Status: DISCONTINUED | OUTPATIENT
Start: 2019-10-02 | End: 2019-10-02 | Stop reason: HOSPADM

## 2019-10-02 RX ORDER — OMEPRAZOLE 20 MG/1
40 TABLET, DELAYED RELEASE ORAL DAILY
COMMUNITY
Start: 2019-10-02

## 2019-10-02 RX ORDER — FLUMAZENIL 0.1 MG/ML
0.2 INJECTION, SOLUTION INTRAVENOUS
Status: DISCONTINUED | OUTPATIENT
Start: 2019-10-02 | End: 2019-10-02 | Stop reason: HOSPADM

## 2019-10-02 RX ORDER — PANTOPRAZOLE SODIUM 40 MG/1
40 TABLET, DELAYED RELEASE ORAL
Status: DISCONTINUED | OUTPATIENT
Start: 2019-10-02 | End: 2019-10-02

## 2019-10-02 RX ORDER — PANTOPRAZOLE SODIUM 40 MG/1
40 TABLET, DELAYED RELEASE ORAL
Status: DISCONTINUED | OUTPATIENT
Start: 2019-10-02 | End: 2019-10-02 | Stop reason: HOSPADM

## 2019-10-02 RX ADMIN — SODIUM CHLORIDE: 9 INJECTION, SOLUTION INTRAVENOUS at 02:12

## 2019-10-02 ASSESSMENT — ACTIVITIES OF DAILY LIVING (ADL)
ADLS_ACUITY_SCORE: 13

## 2019-10-02 NOTE — DISCHARGE SUMMARY
[facility]  Hospitalist Discharge Summary       Date of Admission:  9/30/2019  Date of Discharge:  10/2/2019  Discharging Provider: Jeremy Smith MD      Discharge Diagnoses   Melena   Gastrointestinal hemorrhage associated with duodenal ulcer  Acute blood loss anemia  Dyslipidemia  Chronic kidney disease, stage III  Leukocytosis  History of gout  Alcohol overuse    Follow-ups Needed After Discharge   Follow-up Appointments     Follow-up and recommended labs and tests       Follow up with primary care provider, Calderon Bertrand, within 7 days for   hospital follow- up.  The following labs/tests are recommended:   hemoglobin. Follow up with gastroenterology as directed, recommend H   pylori stool testing as an outpatient through GI clinic.             Hospital Course   Augusto Henao is a 64 year-old M with past medical history of dyslipidemia, gout, chronic kidney disease stage III, and pancreatitis, who was admitted 9/30/2019 for evaluation of black stools and lightheadedness.      Melena   Gastrointestinal hemorrhage associated with duodenal ulcer  Acute blood loss anemia  Presents with black stools associated with weakness and lightheadedness. Reports occasional Excedrin use as well as daily alcohol use. Hemoglobin 9.0 g/dl on admission from 15.1 g/dl 8/2/19.   - GI consulted, underwent EGD which demonstrated multiple non-bleeding duodenal ulcers. Colonoscopy with diverticulosis, small polyp  - Hemoglobin stable, no bleeding observed while hospitalized  - Follow-up with GI as outpatient to consider capsule endoscopy as well as H pylori stool testing  - Continue PPI for at least 4 weeks, or as directed by gastroenterology  - Advised to avoid all NSAIDs, decrease alcohol intake  - Follow-up with primary care provider for recheck of hemoglobin    Dyslipidemia  Continue prior to admission statin.     Chronic kidney disease, stage III  Baseline creatinine 1.3-1.7 with GFR in 40s-50s. Creatinine  1.30 on admission.   - Creatinine stable and at baseline    Leukocytosis, resolved  WBC elevated at 12.9, likely reactive.  He denies any fevers, no cold symptoms, no urinary symptoms.  We will monitor him off antibiotics.  Repeat CBC in the morning.   - WBC normalized     History of gout  No acute issues    Alcohol overuse  He reports drinking 2 to 3 drinks of rum daily.  He denies history of alcohol withdrawals.  - Denies any history of work, family or legal troubles related to alcohol  - Discussed recommended alcohol limits, risks of overuse.     Consultations This Hospital Stay   GASTROENTEROLOGY IP CONSULT    Code Status   Full Code    Time Spent on this Encounter   I, Jeremy Smith MD, personally saw the patient today and spent less than or equal to 30 minutes discharging this patient.       Jeremy Smith MD  [facility]  ______________________________________________________________________    Physical Exam   Vital Signs: Temp: 97.5  F (36.4  C) Temp src: Oral BP: 125/70 Pulse: 74 Heart Rate: 73 Resp: (!) 7 SpO2: 100 % O2 Device: Nasal cannula Oxygen Delivery: 2 LPM  Weight: 211 lbs 12.8 oz    Constitutional: NAD  Respiratory: Clear to auscultation bilaterally, good air movement bilaterally  Cardiovascular: RRR, no m/r/g. No peripheral edema.  GI: Soft, non-tender, non-distended.  Skin/Integumen: Warm, dry  Other:         Primary Care Physician   Calderon Bertrand    Discharge Disposition   Discharged to home  Condition at discharge: Stable      Discharge Orders      Reason for your hospital stay    You were hospitalized for low hemoglobin due to a gastrointestinal bleed.     Follow-up and recommended labs and tests     Follow up with primary care provider, Calderon Bertrand, within 7 days for hospital follow- up.  The following labs/tests are recommended: hemoglobin. Follow up with gastroenterology as directed, recommend H pylori stool testing as an outpatient through GI clinic.     Activity     Your activity upon discharge: activity as tolerated     Discharge Instructions    Avoid taking NSAIDs, a class of medications that includes ibuprofen [Advil, Motrin], naproxen [Naprosyn, Aleve], aspirin (other than daily aspirin recommended by your doctor), as these medications may increase your risk of bleeding and ulcer formation. Acetaminophen [Tylenol] does not specifically increase your risk of bleeding, and can be used for minor pains and fevers.     Full Code    Per previous documentation.     Diet    Follow this diet upon discharge: Regular diet     Discharge Medications   Current Discharge Medication List      START taking these medications    Details   omeprazole (PRILOSEC OTC) 20 MG EC tablet Take 2 tablets (40 mg) by mouth daily For at least 4 weeks, or as directed by gastroenterology.    Associated Diagnoses: Gastrointestinal hemorrhage associated with duodenal ulcer         CONTINUE these medications which have NOT CHANGED    Details   rosuvastatin (CRESTOR) 5 MG tablet Take 5 mg by mouth daily             Significant Results and Procedures   Most Recent 3 CBC's:  Recent Labs   Lab Test 10/02/19  0423 10/01/19  1806 10/01/19  1222 10/01/19  0625  09/30/19  1736   WBC  --   --   --  8.3  --  12.9*   HGB 7.8* 8.3* 8.1* 7.5*   < > 9.0*   MCV  --   --   --  97  --  95   PLT  --   --   --  146*  --  200    < > = values in this interval not displayed.     Most Recent 3 BMP's:  Recent Labs   Lab Test 10/02/19  0423 10/01/19  0625 09/30/19  1736    140 138   POTASSIUM 3.8 3.8 3.8   CHLORIDE 111* 111* 105   CO2 27 26 29   BUN 11 17 25   CR 1.21 1.29* 1.30*   ANIONGAP 3 3 4   SWAPNIL 8.1* 7.9* 8.8   GLC 84 94 96     Most Recent 2 LFT's:  Recent Labs   Lab Test 09/30/19 1736   AST 27   ALT 28   ALKPHOS 63   BILITOTAL 0.4     Most Recent 3 INR's:  Recent Labs   Lab Test 09/30/19 1736   INR 1.07     Most Recent 3 Troponin's:No lab results found.  Most Recent 3 BNP's:No lab results found.  Most Recent  Cholesterol Panel:No lab results found.  Most Recent 6 Bacteria Isolates From Any Culture (See EPIC Reports for Culture Details):No lab results found.  Most Recent TSH and T4:No lab results found.  Most Recent Hemoglobin A1c:No lab results found.  Most Recent Urinalysis:No lab results found.  Most Recent ABG:No lab results found.  Most Recent ESR & CRP:No lab results found.,   Results for orders placed or performed in visit on 11/14/09   LT X-RAY KNEE 1 OR 2 VIEW    Impression       KNEE 1-2 VIEWS LEFT *   Nov 14, 2009 7:11:00 PM      HISTORY:  Lt knee injury     IMPRESSION: Probable joint effusion. No apparent fracture.       Allergies   Allergies   Allergen Reactions     Ibuprofen      Other reaction(s): Edema     Indomethacin Other (See Comments)     Put him into stage 3 renal failure     Morphine      Other reaction(s): Edema

## 2019-10-02 NOTE — PROGRESS NOTES
DATE & TIME: 4344-9161 10/1/19    Cognitive Concerns/ Orientation : A/Ox4   BEHAVIOR & AGGRESSION TOOL COLOR: green   CIWA SCORE: 0    ABNL VS/O2: VSS. RA  MOBILITY: SBA (pt refusing bed alarm due to needing consistent bathroom use because of bowel prep; he has been educated on the risks of this and still chooses to have the alarm off)  PAIN MANAGMENT: denies   DIET: clears   BOWEL/BLADDER: on bowel prep; continent   ABNL LAB/BG: K 3.8. Hgb 8.3-->7.8  DRAIN/DEVICES: NS @100   TELEMETRY RHYTHM: NSR   SKIN: WNL  TESTS/PROCEDURES: colonoscopy tomorrow 10/2/19  D/C DAY/GOALS/PLACE: discharge TBD  OTHER IMPORTANT INFO: Hgb Q6H checks per GI note. Transfuse orders less then 7. No dark or bloody stools.

## 2019-10-02 NOTE — PLAN OF CARE
Discharge    Patient discharged to home via WC with wife.  Care plan note:done    Listed belongings gathered and returned to patient. Yes  Care Plan and Patient education resolved: Yes  Prescriptions if needed, hard copies sent with patient  NA  Home and hospital acquired medications returned to patient: Yes  Medication Bin checked and emptied on discharge Yes  Follow up appointment made for patient: No

## 2019-10-02 NOTE — PROGRESS NOTES
Patient had VSS, denied any chest pain and SOB. Patient has colonoscopy today that was negative for any source of bleeding. Patient tolerated regular diet. No signs of bleeding, last hgb was 7.8. IV access taken out and tip intact. Medically stable to discharge to home. Wife will . All discharge instructions explained to patient and all questions answered.

## 2019-10-02 NOTE — PROGRESS NOTES
[facility]  Gastroenterology Progress Note     Augusto Henao MRN# [id number]   YOB: 1955 Age: 64 year old          Assessment and Plan:     GI bleeding    Anemia  Patient appears clinically well. Hemoglobin has been stable around 7.5-8.3.  EGD on 10/1 revealed duodenal ulceration with no evidence of recent bleed. Scheduled for colonoscopy today. Has finished prep. NO blood noted in stool.   1. Colonoscopy this morning  2. Keep NPO.  3. Hemoglobin every 6 hours  4. Transfuse for hemoglobin less than 7.0  5. Will switch from IV pantoprazole to oral pantoprazole 40 mg BID          Melena  Gastrointestinal hemorrhage, unspecified gastrointestinal hemorrhage type  Anemia, unspecified type      Interval History:   no new complaints, doing well, denies chest pain, denies shortness of breath, denies abdominal pain, alert, oriented to person, place and time, has had a bowel movement in the last 24 hours and doing well; no cp, sob, n/v/d, or abd pain.              Review of Systems:   C: NEGATIVE for fever, chills, change in weight  E/M: NEGATIVE for ear, mouth and throat problems  R: NEGATIVE for significant cough or SOB  CV: NEGATIVE for chest pain, palpitations or peripheral edema             Medications:   I have reviewed this patient's current medications    pantoprazole (PROTONIX) IV  40 mg Intravenous BID     sodium chloride (PF)  3 mL Intracatheter Q8H     traZODone  25 mg Oral At Bedtime    Or     traZODone  50 mg Oral At Bedtime                  Physical Exam:   Vitals were reviewed  Vital Signs with Ranges  Temp:  [97.7  F (36.5  C)-98  F (36.7  C)] 97.7  F (36.5  C)  Pulse:  [67-81] 73  Heart Rate:  [66-84] 74  Resp:  [0-22] 16  BP: ()/(57-73) 115/60  SpO2:  [94 %-100 %] 98 %  I/O last 3 completed shifts:  In: 2595 [P.O.:500; I.V.:2095]  Out: -   Constitutional: healthy, alert and no distress   Cardiovascular: negative, PMI normal. No lifts, heaves, or thrills. RRR. No  murmurs, clicks gallops or rub  Respiratory: negative, Percussion normal. Good diaphragmatic excursion. Lungs clear  Head: Normocephalic. No masses, lesions, tenderness or abnormalities  Neck: Neck supple. No adenopathy. Thyroid symmetric, normal size,, Carotids without bruits.  Abdomen: Abdomen soft, non-tender. BS normal. No masses, organomegaly           Data:   I reviewed the patient's new clinical lab test results.   Recent Labs   Lab Test 10/02/19  0423 10/01/19  1806 10/01/19  1222 10/01/19  0625  09/30/19  1736   WBC  --   --   --  8.3  --  12.9*   HGB 7.8* 8.3* 8.1* 7.5*   < > 9.0*   MCV  --   --   --  97  --  95   PLT  --   --   --  146*  --  200   INR  --   --   --   --   --  1.07    < > = values in this interval not displayed.     Recent Labs   Lab Test 10/02/19  0423 10/01/19  0625 09/30/19  1736   POTASSIUM 3.8 3.8 3.8   CHLORIDE 111* 111* 105   CO2 27 26 29   BUN 11 17 25   ANIONGAP 3 3 4     Recent Labs   Lab Test 09/30/19  1736   ALBUMIN 3.4   BILITOTAL 0.4   ALT 28   AST 27       I reviewed the patient's new imaging results.    All laboratory data reviewed  All imaging studies reviewed by me.    Christina Higgins PA-C,  10/2/2019  [facility]  Office : [phone]  Cell: [phone] (Dr. Jarrett)  Cell: [phone] (Christina Higgins PA-C)

## 2019-10-02 NOTE — PLAN OF CARE
[id number]:    Pt continues with bowel prep for colonoscopy tomorrow. He reports stools are watery and clearing. No signs of bleeding. Hgb stable. He declines the bed alarm while on bowel prep, appears stable on his feet.    VSS on room air. Denies chest pain/palpitations, dyspnea, pain.

## 2020-10-16 ENCOUNTER — HOSPITAL LABORATORY (OUTPATIENT)
Dept: OTHER | Facility: CLINIC | Age: 65
End: 2020-10-16

## 2020-10-16 LAB
APPEARANCE FLD: NORMAL
COLOR FLD: NORMAL
CRYSTALS SNV MICRO: ABNORMAL
SPECIMEN SOURCE FLD: NORMAL
SPECIMEN SOURCE SNV: ABNORMAL
WBC # FLD AUTO: NORMAL /UL

## 2021-12-04 ENCOUNTER — OFFICE VISIT (OUTPATIENT)
Dept: URGENT CARE | Facility: URGENT CARE | Age: 66
End: 2021-12-04
Payer: COMMERCIAL

## 2021-12-04 VITALS
SYSTOLIC BLOOD PRESSURE: 119 MMHG | TEMPERATURE: 98.1 F | BODY MASS INDEX: 26.68 KG/M2 | WEIGHT: 225 LBS | HEART RATE: 70 BPM | OXYGEN SATURATION: 95 % | DIASTOLIC BLOOD PRESSURE: 70 MMHG

## 2021-12-04 DIAGNOSIS — J98.01 BRONCHOSPASM: ICD-10-CM

## 2021-12-04 DIAGNOSIS — R07.0 THROAT PAIN: Primary | ICD-10-CM

## 2021-12-04 DIAGNOSIS — J20.9 ACUTE BRONCHITIS, UNSPECIFIED ORGANISM: ICD-10-CM

## 2021-12-04 LAB
DEPRECATED S PYO AG THROAT QL EIA: NEGATIVE
GROUP A STREP BY PCR: NOT DETECTED

## 2021-12-04 PROCEDURE — 87651 STREP A DNA AMP PROBE: CPT | Performed by: FAMILY MEDICINE

## 2021-12-04 PROCEDURE — 99203 OFFICE O/P NEW LOW 30 MIN: CPT

## 2021-12-04 RX ORDER — AZITHROMYCIN 250 MG/1
TABLET, FILM COATED ORAL
Qty: 6 TABLET | Refills: 0 | Status: SHIPPED | OUTPATIENT
Start: 2021-12-04 | End: 2021-12-09

## 2021-12-04 RX ORDER — PREDNISONE 20 MG/1
TABLET ORAL
Qty: 10 TABLET | Refills: 0 | Status: SHIPPED | OUTPATIENT
Start: 2021-12-04

## 2021-12-04 RX ORDER — ALLOPURINOL 300 MG/1
300 TABLET ORAL DAILY
COMMUNITY

## 2021-12-04 NOTE — PROGRESS NOTES
Assessment & Plan     Acute bronchitis, unspecified organism  Bronchospasm    - azithromycin (ZITHROMAX) 250 MG tablet; Take 2 tablets (500 mg) by mouth daily for 1 day, THEN 1 tablet (250 mg) daily for 4 days.  - predniSONE (DELTASONE) 20 MG tablet; 2 tabs once daily x 5 days    Treat with Zpak and prednisone burst.  Close Follow-up if no change or worsening sx prn.    Throat pain    - Streptococcus A Rapid Screen w/Reflex to PCR - Clinic Collect  - Group A Streptococcus PCR Throat Swab    Reassured RST is negative    Esha Precaido MD   Urgent Care Provider  [facility] DALTON Casas is a 66 year old who presents for the following health issues     HPI     ST and cold sx x 1 week.  Tested negative for COVID on Monday.  No fever.  Has been vaccinated but not boostered.  Harsh cough- cannot lay down and sleep due to cough    Review of Systems   Constitutional, HEENT, cardiovascular, pulmonary, GI, , musculoskeletal, neuro, skin, endocrine and psych systems are negative, except as otherwise noted.      Objective    /70   Pulse 70   Temp 98.1  F (36.7  C) (Tympanic)   Wt 102.1 kg (225 lb)   SpO2 95%   BMI 26.68 kg/m    Body mass index is 26.68 kg/m .  Physical Exam   GENERAL: healthy, alert and no distress  EYES: Eyes grossly normal to inspection, PERRL and conjunctivae and sclerae normal  HENT: ear canals and TM's normal, nose and mouth without ulcers or lesions  NECK: no adenopathy, no asymmetry, masses, or scars and thyroid normal to palpation  RESP: lungs otherwise clear to auscultation with few scattered wheezes with inspiration and with harsh cough, no rhonchi or rales  CV: regular rate and rhythm, normal S1 S2, no S3 or S4, no murmur, click or rub, no peripheral edema and peripheral pulses strong  ABDOMEN: soft, nontender, no hepatosplenomegaly, no masses and bowel sounds normal  MS: no gross musculoskeletal defects noted, no edema  PSYCH: mentation appears  normal, affect normal/bright    Results for orders placed or performed in visit on 12/04/21 (from the past 24 hour(s))   Streptococcus A Rapid Screen w/Reflex to PCR - Clinic Collect    Specimen: Throat; Swab   Result Value Ref Range    Group A Strep antigen Negative Negative

## 2023-12-06 ENCOUNTER — OFFICE VISIT (OUTPATIENT)
Dept: URGENT CARE | Facility: URGENT CARE | Age: 68
End: 2023-12-06
Payer: COMMERCIAL

## 2023-12-06 ENCOUNTER — ANCILLARY PROCEDURE (OUTPATIENT)
Dept: GENERAL RADIOLOGY | Facility: CLINIC | Age: 68
End: 2023-12-06
Attending: EMERGENCY MEDICINE
Payer: COMMERCIAL

## 2023-12-06 VITALS
HEART RATE: 63 BPM | TEMPERATURE: 98.3 F | DIASTOLIC BLOOD PRESSURE: 80 MMHG | OXYGEN SATURATION: 98 % | SYSTOLIC BLOOD PRESSURE: 129 MMHG

## 2023-12-06 DIAGNOSIS — J40 BRONCHITIS WITH ACUTE WHEEZING: Primary | ICD-10-CM

## 2023-12-06 DIAGNOSIS — J02.9 PHARYNGITIS, UNSPECIFIED ETIOLOGY: ICD-10-CM

## 2023-12-06 DIAGNOSIS — J40 BRONCHITIS WITH ACUTE WHEEZING: ICD-10-CM

## 2023-12-06 LAB
DEPRECATED S PYO AG THROAT QL EIA: NEGATIVE
GROUP A STREP BY PCR: NOT DETECTED

## 2023-12-06 PROCEDURE — 71046 X-RAY EXAM CHEST 2 VIEWS: CPT | Mod: TC | Performed by: RADIOLOGY

## 2023-12-06 PROCEDURE — 87651 STREP A DNA AMP PROBE: CPT | Performed by: EMERGENCY MEDICINE

## 2023-12-06 PROCEDURE — 99214 OFFICE O/P EST MOD 30 MIN: CPT | Performed by: EMERGENCY MEDICINE

## 2023-12-06 RX ORDER — METHYLPREDNISOLONE 4 MG
TABLET, DOSE PACK ORAL
Qty: 21 TABLET | Refills: 0 | Status: SHIPPED | OUTPATIENT
Start: 2023-12-06

## 2023-12-06 NOTE — PROGRESS NOTES
Assessment & Plan     Diagnosis:    ICD-10-CM    1. Bronchitis with acute wheezing  J40 XR Chest 2 Views     methylPREDNISolone (MEDROL DOSEPAK) 4 MG tablet therapy pack      2. Pharyngitis, unspecified etiology  J02.9 Streptococcus A Rapid Screen w/Reflex to PCR - Clinic Collect     Group A Streptococcus PCR Throat Swab          Medical Decision Making:  Augusto Henao is a 68 year old male who presents for evaluation of .  This is consistent with an upper respiratory tract infection.  There is no signs at this point of serious bacterial infection such as OM, RPA, epiglottitis, PTA, strep pharyngitis.    Given marked wheezing and worsening cough, I did a CXR to eval for pneumonia. This shows . There are no signs of complications of URI/bronchitis at this point such as septic shock, bacteremia, empyema, hypoxia, respiratory failure or compromise. Is very wheezy here -- will start Medrol Dosepak.    Close followup with PCP is indicated.  Go to ED for fever > 102 F, protracted vomiting, worsening shortness of breath, chest pain or other concerns.  Patient verbalized understanding and agreement with the plan was discharged in stable condition.      Nicholas Ojeda PA-C  [facility]    Subjective     Augusto Henao is a 68 year old male who presents to clinic today for the following health issues:  Chief Complaint   Patient presents with    Cough     X5 days. Negative at home covid test done on Monday       HPI  Patient reports for the last 5 days experiencing cough, wheezing, sore throat, body aches.  No measured fevers at home.  Feels slightly short of breath when having coughing fits and wheezing, but otherwise breathing okay.  No chest pain, difficulty swallowing, ear pain, nausea, vomiting, diarrhea or other concerns.      Review of Systems    See HPI    Objective      Vitals: /80   Pulse 63   Temp 98.3  F (36.8  C) (Tympanic)   SpO2 98%   Resp: 16    Patient Vitals for the past 24  hrs:   BP Temp Temp src Pulse SpO2   12/06/23 1523 129/80 98.3  F (36.8  C) Tympanic 63 98 %       Vital signs reviewed by: Nicholas Ojeda PA-C    Physical Exam   Constitutional: Patient is alert and cooperative. No acute distress.  Ears: Right TM is normal. Left TM is normal. External ear canals are normal.  Mouth: Mucous membranes are moist. Normal tongue and tonsil. Posterior oropharynx is clear.  Cardiovascular: Regular rate and rhythm  Pulmonary/Chest: Diffuse rhonchi and wheezes throughout all lung fields.  No rales.  Effort normal.  Speaking full sentences, no respiratory distress.  GI: Abdomen is soft and non-tender throughout. No CVA tenderness bilaterally.  Neurological: Alert and oriented x3.   Skin: No rash noted on visualized skin.  Psychiatric:The patient has a normal mood and affect.     Labs/Imaging:  Results for orders placed or performed in visit on 12/06/23   XR Chest 2 Views     Status: None    Narrative    XR CHEST 2 VIEWS 12/6/2023 4:12 PM    HISTORY: Bronchitis with acute wheezing    COMPARISON: None.      Impression    IMPRESSION: No infiltrate, pleural effusion or pneumothorax. The  cardiac and mediastinal silhouettes are normal.    JIM LINARES MD         SYSTEM ID:  [id number]   Results for orders placed or performed in visit on 12/06/23   Streptococcus A Rapid Screen w/Reflex to PCR - Clinic Collect     Status: Normal    Specimen: Throat; Swab   Result Value Ref Range    Group A Strep antigen Negative Negative         Nicholas Ojeda PA-C, December 6, 2023

## (undated) RX ORDER — FENTANYL CITRATE 50 UG/ML
INJECTION, SOLUTION INTRAMUSCULAR; INTRAVENOUS
Status: DISPENSED
Start: 2019-10-02

## (undated) RX ORDER — FENTANYL CITRATE 50 UG/ML
INJECTION, SOLUTION INTRAMUSCULAR; INTRAVENOUS
Status: DISPENSED
Start: 2019-10-01